# Patient Record
Sex: FEMALE | Race: WHITE
[De-identification: names, ages, dates, MRNs, and addresses within clinical notes are randomized per-mention and may not be internally consistent; named-entity substitution may affect disease eponyms.]

---

## 2019-12-30 ENCOUNTER — HOSPITAL ENCOUNTER (INPATIENT)
Dept: HOSPITAL 1 - ED | Age: 54
LOS: 2 days | Discharge: HOME | DRG: 73 | End: 2020-01-01
Attending: FAMILY MEDICINE | Admitting: FAMILY MEDICINE
Payer: COMMERCIAL

## 2019-12-30 VITALS — WEIGHT: 169.76 LBS | HEIGHT: 66 IN | BODY MASS INDEX: 27.28 KG/M2

## 2019-12-30 DIAGNOSIS — N17.0: ICD-10-CM

## 2019-12-30 DIAGNOSIS — E03.9: ICD-10-CM

## 2019-12-30 DIAGNOSIS — Z79.84: ICD-10-CM

## 2019-12-30 DIAGNOSIS — Z90.49: ICD-10-CM

## 2019-12-30 DIAGNOSIS — Z88.8: ICD-10-CM

## 2019-12-30 DIAGNOSIS — F41.9: ICD-10-CM

## 2019-12-30 DIAGNOSIS — K21.9: ICD-10-CM

## 2019-12-30 DIAGNOSIS — Z90.710: ICD-10-CM

## 2019-12-30 DIAGNOSIS — K52.9: ICD-10-CM

## 2019-12-30 DIAGNOSIS — E11.43: Primary | ICD-10-CM

## 2019-12-30 DIAGNOSIS — E86.0: ICD-10-CM

## 2019-12-30 DIAGNOSIS — K31.84: ICD-10-CM

## 2019-12-30 LAB
ALBUMIN SERPL-MCNC: 3.8 G/DL (ref 3.4–5)
ALP SERPL-CCNC: 173 U/L (ref 46–116)
ALT SERPL-CCNC: 161 U/L (ref 14–59)
AST SERPL-CCNC: 75 U/L (ref 15–37)
BILIRUB SERPL-MCNC: 0.7 MG/DL (ref 0.2–1)
BUN SERPL-MCNC: 26 MG/DL (ref 7–18)
CALCIUM SERPL-MCNC: 9.2 MG/DL (ref 8.5–10.1)
CHLORIDE SERPL-SCNC: 101 MMOL/L (ref 98–107)
CHOLEST SERPL-MCNC: 206 MG/DL (ref ?–200)
CHOLEST/HDLC SERPL: 4.5 MG/DL
CO2 SERPL-SCNC: 25.9 MMOL/L (ref 21–32)
CREAT SERPL-MCNC: 0.9 MG/DL (ref 0.6–1)
ERYTHROCYTE [DISTWIDTH] IN BLOOD BY AUTOMATED COUNT: 12.9 % (ref 11.5–14.5)
GFR SERPLBLD BASED ON 1.73 SQ M-ARVRAT: > 60 ML/MIN
GLUCOSE SERPL-MCNC: 109 MG/DL (ref 74–106)
HDLC SERPL-MCNC: 46 MG/DL (ref 40–60)
LIPASE SERPL-CCNC: 114 IU/L (ref 73–393)
MICROSCOPIC UR-IMP: YES
MONOCYTES NFR BLD: 7 % (ref 0–7)
NEUTS BAND NFR BLD: 1 % (ref 0–10)
NEUTS SEG NFR BLD MANUAL: 82 % (ref 37–75)
PLATELET # BLD: 187 X10^3MCL (ref 130–400)
POTASSIUM SERPL-SCNC: 3.9 MMOL/L (ref 3.5–5.1)
PROT SERPL-MCNC: 7.6 G/DL (ref 6.4–8.2)
RBC # UR STRIP.AUTO: NEGATIVE /UL
RBC MORPH BLD: NORMAL
SODIUM SERPL-SCNC: 137 MMOL/L (ref 136–145)
T3 SERPL-MCNC: 0.6 NG/ML
T3RU NFR SERPL: 35 % UPTAKE (ref 30–39)
T4 FREE SERPL-MCNC: 1.07 NG/DL (ref 0.76–1.46)
T4 SERPL-MCNC: 9.6 UG/DL (ref 4.7–13.3)
T4/T3 UPTAKE INDEX SERPL: 3.4 UG/DL (ref 1.4–4.5)
TRIGL SERPL-MCNC: 196 MG/DL (ref ?–150)
UA SPECIFIC GRAVITY: >=1.03 (ref 1–1.03)

## 2019-12-30 PROCEDURE — G0378 HOSPITAL OBSERVATION PER HR: HCPCS

## 2019-12-30 NOTE — NUR
STARTED FLUIDS NC @ 100ML/HR PER ORDER, PT. STATED SHE WAS FEELING NAUSEATED,
ZOFRAN 4 MG IVP ADMINISTERED PER PRN ORDER, PT. TOLERATED WELL, SEE [facility]

## 2019-12-30 NOTE — NUR
PT. SITTING UP IN BED, AAOX4, NO ACUTE DISTRESS NOTED, REPOSITIONED FOR
COMFORT, [facility], 4[facility]10 PAIN HEADACHE, ACHEY, OFFERED PRN TYENOL,
PT AGREED, WILL MEDICATED PER ORDER, SAFETY PRECAUTIONS IN PLACE, PENDING BED
ASSIGNMENT,

## 2019-12-30 NOTE — NUR
PT. LAYING IN BED, AAOX54, NO ACUTE DISTRESS NOTED, CALL LIGHT WITHIN REACH, 
REPOSITIONED FOR COMFORT, 0/10 PAIN, WILL CONTINUE TO MONITOR, M/S HOLD

## 2019-12-30 NOTE — NUR
PATIENT PROVIDES 8 OZ OF APPLE JUICE PER PO CHALLENGE ORDERS. PT TOLERATED
WELL BUT STILL C/O NAUSEA. WILL CONTINUE TO MONITOR.

## 2019-12-31 VITALS — SYSTOLIC BLOOD PRESSURE: 109 MMHG | DIASTOLIC BLOOD PRESSURE: 70 MMHG

## 2019-12-31 VITALS — DIASTOLIC BLOOD PRESSURE: 67 MMHG | SYSTOLIC BLOOD PRESSURE: 106 MMHG

## 2019-12-31 VITALS — DIASTOLIC BLOOD PRESSURE: 71 MMHG | SYSTOLIC BLOOD PRESSURE: 138 MMHG

## 2019-12-31 VITALS — DIASTOLIC BLOOD PRESSURE: 73 MMHG | SYSTOLIC BLOOD PRESSURE: 116 MMHG

## 2019-12-31 VITALS — SYSTOLIC BLOOD PRESSURE: 136 MMHG | DIASTOLIC BLOOD PRESSURE: 85 MMHG

## 2019-12-31 VITALS — DIASTOLIC BLOOD PRESSURE: 58 MMHG | SYSTOLIC BLOOD PRESSURE: 112 MMHG

## 2019-12-31 LAB
BASOPHILS NFR BLD: 0.5 % (ref 0–2)
BUN SERPL-MCNC: 15 MG/DL (ref 7–18)
CALCIUM SERPL-MCNC: 8.4 MG/DL (ref 8.5–10.1)
CHLORIDE SERPL-SCNC: 106 MMOL/L (ref 98–107)
CO2 SERPL-SCNC: 26.5 MMOL/L (ref 21–32)
CREAT SERPL-MCNC: 0.8 MG/DL (ref 0.6–1)
ERYTHROCYTE [DISTWIDTH] IN BLOOD BY AUTOMATED COUNT: 13.5 % (ref 11.5–14.5)
GFR SERPLBLD BASED ON 1.73 SQ M-ARVRAT: > 60 ML/MIN
GLUCOSE SERPL-MCNC: 93 MG/DL (ref 74–106)
MAGNESIUM SERPL-MCNC: 1.9 MG/DL (ref 1.8–2.4)
PHOSPHATE SERPL-MCNC: 2.7 MG/DL (ref 2.5–4.9)
PLATELET # BLD: 177 X10^3MCL (ref 130–400)
POTASSIUM SERPL-SCNC: 3.7 MMOL/L (ref 3.5–5.1)
SODIUM SERPL-SCNC: 140 MMOL/L (ref 136–145)

## 2019-12-31 NOTE — NUR
RECEIVED PT FROM ER, PT ADMIT GASTROPARESIS, INTRACTABLE VOMITTING, NAUSEA
PT IS A/O X4, VERBAL RESPONSIVE. LUNG SOUND CLEAR BILATERAL, NO COUGH, NO SOB.
PT DENY ANY CHEST PAIN OR DISCOMFORT. BOWEL SOUND PRESENT ALL 4 QUADRANTS,
DENY ANY N/V AT THIS MOMENT, PEDAL PULSE PRESENT BOTH FEET, NO EDEMA, IV AT
LEFT AC, NO LEAKING, NO INFILTRATION. ALL ADLS ASSIST ALL NEED MET, CALL LIGHT
IN REACH, WILL CONTINUE TO MONITOR.

## 2019-12-31 NOTE — NUR
PATIENT WAS SOUND ASLEEP SINCE I GIVE RESTRORIL,WAS CALLING NOW,SAYS SHE WOKE
UP WITH DIARRHEA,UP IN RESTROOM,CLEANED UP SELF.AMBULATORY,STEADY ON HER
FEET.DR IS AWARE THAT SHE HAS DIARRHEA FROM HOME,BUT THIS IS THE FIRST TIME
SINCE ADMIT.DR DING MADE AWARE.IVF INFUSING WELL,RESUMED AFTER CLEANING
HERSELF.

## 2019-12-31 NOTE — NUR
SHIFT REASSESSMENT DONE.PATIENT ALERT AND ORIENTED.MAKE NEEDS KNOWN TO
STAFF.REPORT OF LOOSE STOOL,ON FLAGYL.BREATHING EASY.NS  CC/
HOUR.MEDSURG PATIENT.SKIN INTACT.SCD IN PLACE.CALL LIGHT IN REACH.

## 2019-12-31 NOTE — NUR
PT RESTING IN BED, AOX4, RESP E/U ON RA. DENIES HEADACHE, NAUSEA OR ABD PAIN
AT THIS TIME. NO ACUTE DISTRESS NOTED. IV TO RFA W/ NO SIGNS OF INFITLRATION,
IVF INFUSING WELL. BED IN LOWEST POSITION AND CALL LIGHT WITHIN REACH. WILL
ENDORSE TO ONCOMING NURSE.

## 2019-12-31 NOTE — NUR
SEEN PATIENT AT THIS TIME,IV ALARMING LAC,FLUSHING IT WITH RESISTANCE.ALFONZO
MADE AWARE IT IS NOT GOOD.

## 2019-12-31 NOTE — NUR
PT RESTING IN BED, AOX4, RESP E/U ON RA. C/O OF MILD HEADACHE BUT TOLERABLE.
DENIES ABD PAIN OR NAUSEA. BED IN LOWEST POSITION AND CALL LIGHT WITHIN REACH.
WILL CONTINUE TO MONITOR.

## 2019-12-31 NOTE — NUR
RECEIVED PT FROM NIGHT SHIFT NURSE. PT RESTING IN BED, RESP E/U ON RA. PT
DENIES NAUSEA OR ABD PAIN AT THIS TIME. ULTRASOUND COMPLETED AT BEDSIDE AT
THIS TIME. IV TO RFA W/ NO SIGNS OF INFILTRATION, IVF INFUSING WELL. BED IN
LOWEST POSITION AND CALL LIGHT WITHIN REACH. WILL CONTINUE TO MONITOR.

## 2020-01-01 VITALS — DIASTOLIC BLOOD PRESSURE: 67 MMHG | SYSTOLIC BLOOD PRESSURE: 115 MMHG

## 2020-01-01 VITALS — SYSTOLIC BLOOD PRESSURE: 119 MMHG | DIASTOLIC BLOOD PRESSURE: 77 MMHG

## 2020-01-01 VITALS — SYSTOLIC BLOOD PRESSURE: 123 MMHG | DIASTOLIC BLOOD PRESSURE: 79 MMHG

## 2020-01-01 LAB
BASOPHILS NFR BLD: 0.7 % (ref 0–2)
BUN SERPL-MCNC: 6 MG/DL (ref 7–18)
CALCIUM SERPL-MCNC: 8.5 MG/DL (ref 8.5–10.1)
CHLORIDE SERPL-SCNC: 108 MMOL/L (ref 98–107)
CO2 SERPL-SCNC: 29 MMOL/L (ref 21–32)
CREAT SERPL-MCNC: 0.7 MG/DL (ref 0.6–1)
ERYTHROCYTE [DISTWIDTH] IN BLOOD BY AUTOMATED COUNT: 13.5 % (ref 11.5–14.5)
GFR SERPLBLD BASED ON 1.73 SQ M-ARVRAT: > 60 ML/MIN
GLUCOSE SERPL-MCNC: 97 MG/DL (ref 74–106)
MAGNESIUM SERPL-MCNC: 1.8 MG/DL (ref 1.8–2.4)
PHOSPHATE SERPL-MCNC: 3.5 MG/DL (ref 2.5–4.9)
PLATELET # BLD: 181 X10^3MCL (ref 130–400)
POTASSIUM SERPL-SCNC: 3.6 MMOL/L (ref 3.5–5.1)
SODIUM SERPL-SCNC: 142 MMOL/L (ref 136–145)

## 2020-01-01 NOTE — NUR
EXPLAINED DISCHARGE INSTRUCTIONS, NEW AND CONTINUED MEDS AND FOLLOW UP APT PT
HAS ON 1/14 AT 0945 WITH PRIMARY DOC. PT AGREED AND SIGNED ALL DOCUMENTS.
REMOVED IV TOTHE RFA CATHETER TIP INTACT. WAITING FOR HER RIDE. WILL CONTINUE
TO MONITOR.

## 2020-01-01 NOTE — NUR
TOMY MELÉNDEZ WALKED PT DOWN TO FRONT OF HOSPITAL, PT IS CLEAR ON ALL INSTRUCTIONS
AND FOLLOW UP APPT. PT IS BREATHING EVEN AND UNLABORED ON RA NO ACUTE RESP
DISTRESS OR SOB NOTED. PT DISCHARGED HOME,  WILL BE DRIVING HER HOME.

## 2020-01-01 NOTE — NUR
PT ENDORSE TO ME THIS MORNING, LAYING IN BED RESTING, AA/O X4, BREATHING EVEN
AND UNLABORED ON RA, NO ACUTE RESP DISTRESS OR SOB NOTED. MEDSURG, DENIES ANY
CP OR PRESSURE. DENIES ANY ABD PAIN OR DISCOMFORT AT THIS TIME, LAST LOOSE
STOOL 12/31. VOIDING FREELY. AMB. IV OTHE RFA INTACT AND PATENT INFUSING AT 10
ML/HR. NO REDNESS OR SWELLING NOTED. CALL LIGHT IN REACH. BED IN LOW POSITION.
WILL CONTINUE TO MONITOR.